# Patient Record
Sex: FEMALE | Race: OTHER | HISPANIC OR LATINO | URBAN - METROPOLITAN AREA
[De-identification: names, ages, dates, MRNs, and addresses within clinical notes are randomized per-mention and may not be internally consistent; named-entity substitution may affect disease eponyms.]

---

## 2018-05-31 ENCOUNTER — EMERGENCY (EMERGENCY)
Facility: HOSPITAL | Age: 68
LOS: 1 days | Discharge: HOME | End: 2018-05-31
Attending: EMERGENCY MEDICINE
Payer: MEDICARE

## 2018-05-31 VITALS
SYSTOLIC BLOOD PRESSURE: 182 MMHG | RESPIRATION RATE: 15 BRPM | DIASTOLIC BLOOD PRESSURE: 92 MMHG | OXYGEN SATURATION: 100 % | HEART RATE: 64 BPM

## 2018-05-31 LAB
ALBUMIN SERPL ELPH-MCNC: 4.4 G/DL — SIGNIFICANT CHANGE UP (ref 3.3–5)
ALP SERPL-CCNC: 96 U/L — SIGNIFICANT CHANGE UP (ref 40–120)
ALT FLD-CCNC: 13 U/L — SIGNIFICANT CHANGE UP (ref 10–45)
ANION GAP SERPL CALC-SCNC: 13 MMOL/L — SIGNIFICANT CHANGE UP (ref 5–17)
APTT BLD: 31.5 SEC — SIGNIFICANT CHANGE UP (ref 27.5–37.4)
AST SERPL-CCNC: 19 U/L — SIGNIFICANT CHANGE UP (ref 10–40)
BASOPHILS # BLD AUTO: 0 K/UL — SIGNIFICANT CHANGE UP (ref 0–0.2)
BASOPHILS NFR BLD AUTO: 0.5 % — SIGNIFICANT CHANGE UP (ref 0–2)
BILIRUB SERPL-MCNC: 0.4 MG/DL — SIGNIFICANT CHANGE UP (ref 0.2–1.2)
BUN SERPL-MCNC: 8 MG/DL — SIGNIFICANT CHANGE UP (ref 7–23)
CALCIUM SERPL-MCNC: 9.3 MG/DL — SIGNIFICANT CHANGE UP (ref 8.4–10.5)
CHLORIDE SERPL-SCNC: 102 MMOL/L — SIGNIFICANT CHANGE UP (ref 96–108)
CO2 SERPL-SCNC: 27 MMOL/L — SIGNIFICANT CHANGE UP (ref 22–31)
CREAT SERPL-MCNC: 0.68 MG/DL — SIGNIFICANT CHANGE UP (ref 0.5–1.3)
EOSINOPHIL # BLD AUTO: 0.2 K/UL — SIGNIFICANT CHANGE UP (ref 0–0.5)
EOSINOPHIL NFR BLD AUTO: 2.2 % — SIGNIFICANT CHANGE UP (ref 0–6)
GAS PNL BLDV: SIGNIFICANT CHANGE UP
GLUCOSE SERPL-MCNC: 96 MG/DL — SIGNIFICANT CHANGE UP (ref 70–99)
HCT VFR BLD CALC: 42.4 % — SIGNIFICANT CHANGE UP (ref 34.5–45)
HGB BLD-MCNC: 14.5 G/DL — SIGNIFICANT CHANGE UP (ref 11.5–15.5)
INR BLD: 1 RATIO — SIGNIFICANT CHANGE UP (ref 0.88–1.16)
LYMPHOCYTES # BLD AUTO: 2 K/UL — SIGNIFICANT CHANGE UP (ref 1–3.3)
LYMPHOCYTES # BLD AUTO: 27.1 % — SIGNIFICANT CHANGE UP (ref 13–44)
MCHC RBC-ENTMCNC: 31.4 PG — SIGNIFICANT CHANGE UP (ref 27–34)
MCHC RBC-ENTMCNC: 34.2 GM/DL — SIGNIFICANT CHANGE UP (ref 32–36)
MCV RBC AUTO: 91.8 FL — SIGNIFICANT CHANGE UP (ref 80–100)
MONOCYTES # BLD AUTO: 0.6 K/UL — SIGNIFICANT CHANGE UP (ref 0–0.9)
MONOCYTES NFR BLD AUTO: 8.6 % — SIGNIFICANT CHANGE UP (ref 2–14)
NEUTROPHILS # BLD AUTO: 4.5 K/UL — SIGNIFICANT CHANGE UP (ref 1.8–7.4)
NEUTROPHILS NFR BLD AUTO: 61.6 % — SIGNIFICANT CHANGE UP (ref 43–77)
NT-PROBNP SERPL-SCNC: 38 PG/ML — SIGNIFICANT CHANGE UP (ref 0–300)
PLATELET # BLD AUTO: 228 K/UL — SIGNIFICANT CHANGE UP (ref 150–400)
POTASSIUM SERPL-MCNC: 4.4 MMOL/L — SIGNIFICANT CHANGE UP (ref 3.5–5.3)
POTASSIUM SERPL-SCNC: 4.4 MMOL/L — SIGNIFICANT CHANGE UP (ref 3.5–5.3)
PROT SERPL-MCNC: 7.5 G/DL — SIGNIFICANT CHANGE UP (ref 6–8.3)
PROTHROM AB SERPL-ACNC: 10.9 SEC — SIGNIFICANT CHANGE UP (ref 9.8–12.7)
RBC # BLD: 4.62 M/UL — SIGNIFICANT CHANGE UP (ref 3.8–5.2)
RBC # FLD: 11.2 % — SIGNIFICANT CHANGE UP (ref 10.3–14.5)
SODIUM SERPL-SCNC: 142 MMOL/L — SIGNIFICANT CHANGE UP (ref 135–145)
TROPONIN T SERPL-MCNC: <0.01 NG/ML — SIGNIFICANT CHANGE UP (ref 0–0.06)
TROPONIN T SERPL-MCNC: <0.01 NG/ML — SIGNIFICANT CHANGE UP (ref 0–0.06)
WBC # BLD: 7.4 K/UL — SIGNIFICANT CHANGE UP (ref 3.8–10.5)
WBC # FLD AUTO: 7.4 K/UL — SIGNIFICANT CHANGE UP (ref 3.8–10.5)

## 2018-05-31 PROCEDURE — 71045 X-RAY EXAM CHEST 1 VIEW: CPT | Mod: 26,77

## 2018-05-31 PROCEDURE — 71275 CT ANGIOGRAPHY CHEST: CPT | Mod: 26

## 2018-05-31 PROCEDURE — 99218: CPT | Mod: GC

## 2018-05-31 PROCEDURE — 93971 EXTREMITY STUDY: CPT | Mod: 26

## 2018-05-31 PROCEDURE — 71045 X-RAY EXAM CHEST 1 VIEW: CPT | Mod: 26

## 2018-05-31 PROCEDURE — 93308 TTE F-UP OR LMTD: CPT | Mod: 26

## 2018-05-31 PROCEDURE — 93010 ELECTROCARDIOGRAM REPORT: CPT

## 2018-05-31 RX ORDER — ASPIRIN/CALCIUM CARB/MAGNESIUM 324 MG
325 TABLET ORAL ONCE
Qty: 0 | Refills: 0 | Status: COMPLETED | OUTPATIENT
Start: 2018-05-31 | End: 2018-05-31

## 2018-05-31 RX ORDER — ACETAMINOPHEN 500 MG
975 TABLET ORAL ONCE
Qty: 0 | Refills: 0 | Status: COMPLETED | OUTPATIENT
Start: 2018-05-31 | End: 2018-05-31

## 2018-05-31 RX ORDER — SODIUM CHLORIDE 9 MG/ML
3 INJECTION INTRAMUSCULAR; INTRAVENOUS; SUBCUTANEOUS ONCE
Qty: 0 | Refills: 0 | Status: COMPLETED | OUTPATIENT
Start: 2018-05-31 | End: 2018-05-31

## 2018-05-31 RX ORDER — ASPIRIN/CALCIUM CARB/MAGNESIUM 324 MG
81 TABLET ORAL DAILY
Qty: 0 | Refills: 0 | Status: DISCONTINUED | OUTPATIENT
Start: 2018-05-31 | End: 2018-06-04

## 2018-05-31 RX ADMIN — SODIUM CHLORIDE 3 MILLILITER(S): 9 INJECTION INTRAMUSCULAR; INTRAVENOUS; SUBCUTANEOUS at 13:26

## 2018-05-31 RX ADMIN — Medication 325 MILLIGRAM(S): at 14:49

## 2018-05-31 RX ADMIN — Medication 975 MILLIGRAM(S): at 20:04

## 2018-05-31 NOTE — ED CDU PROVIDER INITIAL DAY NOTE - MUSCULOSKELETAL MINIMAL EXAM
Full AROM all upper/lower extremities bilaterally. No calf pain bilaterally. No tortuous veins noted on either calf/leg.

## 2018-05-31 NOTE — ED CDU PROVIDER INITIAL DAY NOTE - MEDICAL DECISION MAKING DETAILS
dyspnea, worse with exertion.  CTA negative for PE.  will eval for ACS with telemonitoring, deltra troponin, stress, reassess     YULISSA Pritchett MD

## 2018-05-31 NOTE — ED PROVIDER NOTE - PHYSICAL EXAMINATION
AAOX3, non-toxic appearing, NCAT, PERRL, no pallor MMM, Neck Supple, HR regular rate and rhythm no murmur appreciated. no jvd, RESP: good air movement, no wheeze, slightly diminished on LLL, otherwise clear. comfortable at rest. no hypoxia, speaks in full sentences. ABD S/NT/ND EXT warm, pink well perfused, Radial pulses 2+ b/l. dorsalis pedis +2 b/l LLE with 2+ pitting edema, nio calf pain, hyperemic area on left medial knee consistent with post op vericose vein. MAEx4, Sensation to soft touch grossly intact, normal strength/tone.

## 2018-05-31 NOTE — ED CDU PROVIDER DISPOSITION NOTE - CLINICAL COURSE
68 yo female PMHx of asthma, chronic pain on topomax and hydrocodone presented to ED c/o acute onset dyspnea on exertion with LLE swelling that began today 2 days after recent varicose vein surgery on ipsilateral leg. In ED patient's labwork unremarkable trop negative, BNP negative, CTA chest negative for PE, US duplex LLE showed findings c/w superficial thrombophlebitis but no DVT and TTE showed no pericardial effusion. Patient sent to CDU for further workup with CTC, frequent evals, repeat EKG/trop. CTC....... 68 yo female PMHx of asthma, chronic pain on topomax and hydrocodone presented to ED c/o acute onset dyspnea on exertion with LLE swelling that began today 2 days after recent varicose vein surgery on ipsilateral leg. In ED patient's labwork unremarkable trop negative, BNP negative, CTA chest negative for PE, US duplex LLE showed findings c/w superficial thrombophlebitis but no DVT and TTE showed no pericardial effusion. Patient sent to CDU for further workup with pharm nuc stress, frequent evals, repeat EKG/trop. CASSANDRA Alvarenga: Patient seen at bedside in NAD.  VSS.  Patient resting comfortably without complaints. Made ED attending Dr. Sharma aware of pharm nuc stress results which showed small mild defect in distal anterior wall predominantly reversible and mostly corrects with prone imaging suggestive of attenuation artifact with EF 64%. Dr Sharma stated patient safe to be discharged home with PMD/ cardiology follow up
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
## 2018-05-31 NOTE — ED CDU PROVIDER INITIAL DAY NOTE - PROGRESS NOTE DETAILS
Patient is aox3, resting comfortably with no signs of distress noted. Patient states feeling better and without complaints.
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
## 2018-05-31 NOTE — ED CDU PROVIDER INITIAL DAY NOTE - DETAILS
CHEST PAIN  -TELE  -[facility]  -[facility] EVAL  -CT CORONARY  -CASE D/W ATTENDING CHEST PAIN/CASTILLO  -TELE  -[facility]  -FREQ EVAL  -CT CORONARY  -CASE D/W ATTENDING

## 2018-05-31 NOTE — ED CDU PROVIDER DISPOSITION NOTE - ATTENDING CONTRIBUTION TO CARE
Appears well and comfortable. Stress test completed. Not in need of any additional emergent investigation or intervention. Given her results and instructed to f/u with her PCP.

## 2018-05-31 NOTE — ED CDU PROVIDER DISPOSITION NOTE - PLAN OF CARE
Follow up with your Doctor in 1-2 days.  Follow up with cardiology in 1-2 days.  Return to the ER for any persistent/worsening or new symptoms, chest pain, shortness of breath, palpitations, dizziness or any concerning symptoms. Follow up with your Doctor in 1-2 days.  Follow up with cardiology in 1-2 days [phone].  Return to the ER for any persistent/worsening or new symptoms, chest pain, shortness of breath, palpitations, dizziness or any concerning symptoms.
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
## 2018-05-31 NOTE — ED ADULT NURSE NOTE - OBJECTIVE STATEMENT
68 y/o female hx Asthma, presents to the ED via EMS c/o acute SOB while at neurologist office. Pt also endorsing left leg swelling and pain starting today s/p varicose vein removal x 2 days. As per EMS, pt was very SOB upon arrival to ED. Denies CP, n/v, fever, blood thinner use, recent travel/immobility, current diff breathing, birth control, smoking hx, SOB, cough. Pt A&Ox3, in no respiratory distress, no CP, EKG completed, EM applied- NSR, lungs CTA with left lower lung slightly diminished, left lower extremity +2 edema extending to knee, tender to palpitation, pulses present, cap refill <2, resting comfortably, MD at bedside evaluating with US.

## 2018-05-31 NOTE — ED PROVIDER NOTE - MEDICAL DECISION MAKING DETAILS
CASTILLO with LLE swelling concerning for Pulmonary Embolus. Differential also includes superficial thrombphlebitis, veinous incompetence/ lymphedema, less likely heart failure considering asymmetric nature of swelling with no prior cardiac hx and no concerning findings on EKG. 1) IV access/ekg/monitor/labs/xray/oxygen prn  2) PERC +, would send ddimer as patient low probability of PE, if positive will need ct angio of chest  3) reassess

## 2018-05-31 NOTE — ED ADULT NURSE NOTE - CHPI ED SYMPTOMS NEG
no headache/no hemoptysis/no body aches/no chest pain/no cough/no fever/no chills/no wheezing/no diaphoresis

## 2018-06-01 VITALS
HEART RATE: 69 BPM | SYSTOLIC BLOOD PRESSURE: 161 MMHG | DIASTOLIC BLOOD PRESSURE: 79 MMHG | TEMPERATURE: 98 F | OXYGEN SATURATION: 98 % | RESPIRATION RATE: 18 BRPM

## 2018-06-01 LAB
CHOLEST SERPL-MCNC: 196 MG/DL — SIGNIFICANT CHANGE UP (ref 10–199)
HBA1C BLD-MCNC: 5.2 % — SIGNIFICANT CHANGE UP (ref 4–5.6)
HDLC SERPL-MCNC: 55 MG/DL — SIGNIFICANT CHANGE UP (ref 40–125)
LIPID PNL WITH DIRECT LDL SERPL: 123 MG/DL — SIGNIFICANT CHANGE UP
TOTAL CHOLESTEROL/HDL RATIO MEASUREMENT: 3.6 RATIO — SIGNIFICANT CHANGE UP (ref 3.3–7.1)
TRIGL SERPL-MCNC: 91 MG/DL — SIGNIFICANT CHANGE UP (ref 10–149)

## 2018-06-01 PROCEDURE — 82330 ASSAY OF CALCIUM: CPT

## 2018-06-01 PROCEDURE — 85014 HEMATOCRIT: CPT

## 2018-06-01 PROCEDURE — 93016 CV STRESS TEST SUPVJ ONLY: CPT

## 2018-06-01 PROCEDURE — 80061 LIPID PANEL: CPT

## 2018-06-01 PROCEDURE — 84132 ASSAY OF SERUM POTASSIUM: CPT

## 2018-06-01 PROCEDURE — A9500: CPT

## 2018-06-01 PROCEDURE — 83036 HEMOGLOBIN GLYCOSYLATED A1C: CPT

## 2018-06-01 PROCEDURE — 99217: CPT | Mod: GC

## 2018-06-01 PROCEDURE — G0378: CPT

## 2018-06-01 PROCEDURE — 85730 THROMBOPLASTIN TIME PARTIAL: CPT

## 2018-06-01 PROCEDURE — 84295 ASSAY OF SERUM SODIUM: CPT

## 2018-06-01 PROCEDURE — 78452 HT MUSCLE IMAGE SPECT MULT: CPT

## 2018-06-01 PROCEDURE — 93018 CV STRESS TEST I&R ONLY: CPT

## 2018-06-01 PROCEDURE — 85027 COMPLETE CBC AUTOMATED: CPT

## 2018-06-01 PROCEDURE — 78452 HT MUSCLE IMAGE SPECT MULT: CPT | Mod: 26

## 2018-06-01 PROCEDURE — 71275 CT ANGIOGRAPHY CHEST: CPT

## 2018-06-01 PROCEDURE — 93971 EXTREMITY STUDY: CPT

## 2018-06-01 PROCEDURE — 71045 X-RAY EXAM CHEST 1 VIEW: CPT

## 2018-06-01 PROCEDURE — 83605 ASSAY OF LACTIC ACID: CPT

## 2018-06-01 PROCEDURE — 93017 CV STRESS TEST TRACING ONLY: CPT

## 2018-06-01 PROCEDURE — 83880 ASSAY OF NATRIURETIC PEPTIDE: CPT

## 2018-06-01 PROCEDURE — 93005 ELECTROCARDIOGRAM TRACING: CPT | Mod: 76,XU

## 2018-06-01 PROCEDURE — 93308 TTE F-UP OR LMTD: CPT

## 2018-06-01 PROCEDURE — 80053 COMPREHEN METABOLIC PANEL: CPT

## 2018-06-01 PROCEDURE — 82803 BLOOD GASES ANY COMBINATION: CPT

## 2018-06-01 PROCEDURE — 99284 EMERGENCY DEPT VISIT MOD MDM: CPT | Mod: 25

## 2018-06-01 PROCEDURE — 85610 PROTHROMBIN TIME: CPT

## 2018-06-01 PROCEDURE — 82435 ASSAY OF BLOOD CHLORIDE: CPT

## 2018-06-01 PROCEDURE — 82947 ASSAY GLUCOSE BLOOD QUANT: CPT

## 2018-06-01 PROCEDURE — 84484 ASSAY OF TROPONIN QUANT: CPT

## 2018-06-01 RX ORDER — ACETAMINOPHEN 500 MG
650 TABLET ORAL ONCE
Qty: 0 | Refills: 0 | Status: COMPLETED | OUTPATIENT
Start: 2018-06-01 | End: 2018-06-01

## 2018-06-01 RX ORDER — SODIUM CHLORIDE 9 MG/ML
1000 INJECTION INTRAMUSCULAR; INTRAVENOUS; SUBCUTANEOUS ONCE
Qty: 0 | Refills: 0 | Status: COMPLETED | OUTPATIENT
Start: 2018-06-01 | End: 2018-06-01

## 2018-06-01 RX ADMIN — SODIUM CHLORIDE 1000 MILLILITER(S): 9 INJECTION INTRAMUSCULAR; INTRAVENOUS; SUBCUTANEOUS at 08:24

## 2018-06-01 RX ADMIN — Medication 650 MILLIGRAM(S): at 12:07

## 2018-06-01 NOTE — ED ADULT NURSE REASSESSMENT NOTE - NS ED NURSE REASSESS COMMENT FT1
17.00 Pt was reviewed by Dr Sharma pt discharged CASSANDRA mR explained the pt follow up care & pt verbalized the understanding on the same pt is discharged
16.45  Received the Pt from Main ED from [facility] for management of SOB  pt  is  NSR 70/mt on monitor walks steady without support . has no acute respiratory distress Destini N/V/D vitals stable  Pt is oriented to the unit  comfort care & safety measures initiated continue to monitor
Pt. at CT.
Pt received from LADARIUS Mccain. Pt oriented to CDU & plan of care was discussed. Pt endorses chest discomfort, states the chest area "feels weird" and feels like her heart is skipping beats. Pt unable to characterize feeling. CASSANDRA Jaimes aware. EKG & trop done at bedside. Pt states swelling in LLE has gotten better and CASTILLO has improved as well. Safety & comfort measures maintained. Call bell in reach. Will continue to monitor.
7.00 Am received report from [facility] Scott   07.30    Am Pt reassessed . Destini N/V/D fever chills CP SOB   Pt is for nuclear stress pharm test medicated as per order . comfort care & safety measures continued IV  site Left AC changed Right metacarpal  IV site flushing well Pt denies  pain @ IV site   08.15 PT  went for  Nuclear stress test

## 2018-06-01 NOTE — ED CDU PROVIDER SUBSEQUENT DAY NOTE - HISTORY
VSS, Patient is aox3, resting comfortably with no signs of distress noted. Patient states feeling better and without complaints. No event on Tele monitor, patient had second troponin negative w/ ekg unchanged from previous. Will continue monitor, pending CTC in morning.

## 2018-06-01 NOTE — ED CDU PROVIDER SUBSEQUENT DAY NOTE - MEDICAL DECISION MAKING DETAILS
Appears well and comfortable. Stress test completed. Not in need of any additional emergent investigation at this time. Given her results and instructed to f/u with her PCP.

## 2018-06-01 NOTE — ED CDU PROVIDER SUBSEQUENT DAY NOTE - PROGRESS NOTE DETAILS
Pt sleeping, no signs of distress, no events on telemetry. Will continue to monitor CASSANDRA Alvarenga: Patient seen at bedside in NAD.  VSS.  Patient resting comfortably without complaints. Patient initally placed for CT coronary however patient had CTA chest yesterday. Discussed with radiology who recommended to avoid a contrast load within 24 hours despite having normal renal function. Will give normal saline bolus and change order to nuc pharm stress. No events noted over tele. Will make ED attending Dr. Sharma aware CASSANDRA Alvarenga: Patient seen at bedside in NAD.  VSS.  Patient resting however does note headache, weakness and fatigue. Patient has not eaten much in last 24 hours therefore encouraged patient to eat and reassess. Patient pending nuc pharm stress results CASSANDRA Alvarenga: Patient seen at bedside in NAD.  VSS.  Patient resting comfortably without complaints. Made ED attending Dr. Sharma aware of pharm nuc stress results which showed small mild defect in distal anterior wall predominantly reversible and mostly corrects with prone imaging suggestive of attenuation artifacr with EF 64%. Dr Sharma stated patient safe to be discharged home with PMD/cardiology follow up

## 2025-03-05 NOTE — ED CDU PROVIDER SUBSEQUENT DAY NOTE - CROS ED SKIN ALL NEG
Video Visit - Acute *** Follow-up      This was a video encounter.  The patient gave consent to proceed with this video visit.  Identification was verified with the patient's full name and birth date as well as street address.  Patient states they are at home *** during this visit.  The patient indicates that they are in a place where they can talk freely.    This visit was made to Sabine Moore to discuss No chief complaint on file.    She is {Established/New:[id number]}.  [id number] and her identity has been established.   Sabine understands that we are limiting office visits due to the coronavirus pandemic and she consents to a virtual visit with charges submitted to her insurance.   {Time:9146195} minutes were spent in this encounter.  Without the patient being seen and evaluated in person, there is a risk that the information and/or assessment may be incomplete or inaccurate.    HISTORY OF PRESENT ILLNESS:    Pleasant 58 year old female is contacted via video visit today to discuss ***  HPI    ROS      Review Flowsheet  More data exists         3/19/2024   PHQ 2/9 Score   Adult PHQ 2 Score 0   Adult PHQ 2 Interpretation No further screening needed   Little interest or pleasure in activity? Not at all   Feeling down, depressed or hopeless? Not at all      Details                   DEPRESSION ASSESSMENT/PLAN:  {[facility] Depression Screening Follow-Up:333309}    PHYSICAL EXAM:    There were no vitals taken for this visit. ***Vitals unavailable for this visit, due to this being a video encounter.  Patient is an alert, appropriate, pleasant female in no audible apparent distress.  Respiratory:  No audible or apparent *** respiratory distress.  Neurologic:  Alert and oriented x3.    Psychiatric:  Speech and behavior appropriate.  Patient cooperative.    ASSESSMENT/PLAN:  There are no diagnoses linked to this encounter.      No follow-ups on file.    ANTONIO Zamora   how low it went because she was not coherent enough to know to check her sugar. They let her drive home that night by herself without questioning her ability. She does not remember any of the events from the day. The next day that she worked was Friday, they let her work a half a day and then called her into the office and said she would be on leave until she had a physical from [facility], then they told her she would need an examination from her PCP then they told her they were sending her home and asked her at that time if she was ok to drive when she was completely fine on Friday and acting like her normal self. She finally was told that I needed to complete a fit for duty paper and I let her know that I could see her over video. I did let her know that I was shocked that she works as a  and these individuals are supposed to be able to help others with medical issues and they could not recognize she was having one and thought she was drunk. They did not even call EMS to have her examined. I did let her know that I was going to check into getting her dexcom or alphonso due to this incident occurring so someone can monitor if she is having lows since she will be seeing endocrinology. I did let he salomew that I was also going to recommend further training for her coworkers so that they recognize hypoglycemia and act before she could have went into a diabetic coma while \"sleeping\" at her desk and not woke up. She is not only diabetic she also has heart issues and is followed by cardiology for this.       Review of Systems   Constitutional:  Positive for malaise/fatigue.   Respiratory:  Negative for cough, shortness of breath and wheezing.    Cardiovascular:  Negative for chest pain, palpitations and leg swelling.   Musculoskeletal:  Negative for joint pain and myalgias.   Neurological:  Positive for dizziness and headaches. Negative for weakness.   Psychiatric/Behavioral:  Negative for depression and memory  loss. The patient is nervous/anxious and has insomnia.          Review Flowsheet  More data exists         3/19/2024   PHQ 2/9 Score   Adult PHQ 2 Score 0   Adult PHQ 2 Interpretation No further screening needed   Little interest or pleasure in activity? Not at all   Feeling down, depressed or hopeless? Not at all      Details                   DEPRESSION ASSESSMENT/PLAN:  Depression screening is negative no further plan needed.    PHYSICAL EXAM:    There were no vitals taken for this visit. Vitals unavailable for this visit, due to this being a video encounter.  Patient is an alert, appropriate, pleasant female in no audible apparent distress.  Respiratory:  No audible or apparent respiratory distress.  Neurologic:  Alert and oriented x3.    Psychiatric:  Speech and behavior appropriate.  Patient cooperative. Patient is her normal upbeat self and able to answer all questions with appropriate responses.     ASSESSMENT/PLAN:  Hypoglycemia: Dexcom or alphonso as directed. Follow-up with endocrinology as scheduled. FIT for duty form completed today. Follow-up if issues or concerns arise.       Other fatigue: Continue current treatment plan. Follow-up as directed.       No follow-ups on file.    ANTONIO Zamora   negative...